# Patient Record
Sex: FEMALE | Race: WHITE | NOT HISPANIC OR LATINO | Employment: UNEMPLOYED | URBAN - NONMETROPOLITAN AREA
[De-identification: names, ages, dates, MRNs, and addresses within clinical notes are randomized per-mention and may not be internally consistent; named-entity substitution may affect disease eponyms.]

---

## 2023-05-02 ENCOUNTER — NURSE TRIAGE (OUTPATIENT)
Dept: CALL CENTER | Facility: HOSPITAL | Age: 33
End: 2023-05-02
Payer: OTHER GOVERNMENT

## 2023-05-02 NOTE — TELEPHONE ENCOUNTER
Tina states she has had a left eye twitch for 6-8 weeks, now it is causing numbness in from her eyebrow to her cheekbone, sometimes into her lip. Reviewed guideline with tina, advises she see her PCP within 4 hours. Tina states she does not have a PCP. Advised she go to ED or UC for evaluation of this symptoms. States she will talk to her  and make a decision about her care.     Reason for Disposition  • [1] Numbness (i.e., loss of sensation) of the face, arm / hand, or leg / foot on one side of the body AND [2] gradual onset (e.g., days to weeks) AND [3] present now    Additional Information  • Negative: [1] SEVERE weakness (i.e., unable to walk or barely able to walk, requires support) AND [2] new-onset or worsening  • Negative: [1] Weakness (i.e., paralysis, loss of muscle strength) of the face, arm / hand, or leg / foot on one side of the body AND [2] sudden onset AND [3] present now  (Exception: Bell's palsy suspected [i.e., weakness only on one side of the face, developing over hours to days, no other symptoms].)  • Negative: [1] Numbness (i.e., loss of sensation) of the face, arm / hand, or leg / foot on one side of the body AND [2] sudden onset AND [3] present now  • Negative: [1] Loss of speech or garbled speech AND [2] sudden onset AND [3] present now  • Negative: Difficult to awaken or acting confused (e.g., disoriented, slurred speech)  • Negative: Sounds like a life-threatening emergency to the triager  • Negative: Confusion, disorientation, or hallucinations is main symptom  • Negative: Neck pain is main symptom (and having weakness, numbness, or tingling in arm / hand because of neck pain)  • Negative: Back pain is main symptom (and having weakness, numbness, or tingling in leg because of back pain)  • Negative: Hand pain is main symptom (and having mild weakness, numbness, or tingling in hand related to hand pain)  • Negative: Dizziness is main symptom  • Negative: Vision loss or change  "is main symptom  • Negative: Followed a head injury within last 3 days  • Negative: Followed a neck injury within last 3 days  • Negative: [1] Tingling in both hands and/or feet AND [2] breathing faster than normal AND [3] feels similar to prior panic attack or hyperventilation episode  • Negative: Weakness in both sides of the body or weakness all over  • Negative: Headache  (and neurologic deficit)  • Negative: [1] Back pain AND [2] numbness (loss of sensation) in groin or rectal area  • Negative: [1] Unable to urinate (or only a few drops) > 4 hours AND [2] bladder feels very full (e.g., palpable bladder or strong urge to urinate)  • Negative: [1] Loss of bladder or bowel control (urine or bowel incontinence; wetting self, leaking stool) AND [2] new-onset  • Negative: [1] Weakness (i.e., paralysis, loss of muscle strength) of the face, arm / hand, or leg / foot on one side of the body AND [2] sudden onset AND [3] brief (now gone)  • Negative: [1] Numbness (i.e., loss of sensation) of the face, arm / hand, or leg / foot on one side of the body AND [2] sudden onset AND [3] brief (now gone)  • Negative: [1] Loss of speech or garbled speech AND [2] sudden onset AND [3] brief (now gone)  • Negative: Bell's palsy suspected (i.e., weakness on only one side of the face, developing over hours to days, no other symptoms)  • Negative: Patient sounds very sick or weak to the triager  • Negative: Neck pain (and neurologic deficit)  • Negative: Back pain (and neurologic deficit)  • Negative: [1] Weakness of the face, arm / hand, or leg / foot on one side of the body AND [2] gradual onset (e.g., days to weeks) AND [3] present now    Answer Assessment - Initial Assessment Questions  1. SYMPTOM: \"What is the main symptom you are concerned about?\" (e.g., weakness, numbness)      Numbness from eyebrow to cheekbone, sometimes in her lip she associates this with a twitch in her left eye   2. ONSET: \"When did this start?\" (minutes, " "hours, days; while sleeping)      2 weeks ago   3. LAST NORMAL: \"When was the last time you (the patient) were normal (no symptoms)?\"      6-8 weeks ago   4. PATTERN \"Does this come and go, or has it been constant since it started?\"  \"Is it present now?\"      Comes and goes, numbness is there now  5. CARDIAC SYMPTOMS: \"Have you had any of the following symptoms: chest pain, difficulty breathing, palpitations?\"      no  6. NEUROLOGIC SYMPTOMS: \"Have you had any of the following symptoms: headache, dizziness, vision loss, double vision, changes in speech, unsteady on your feet?\"      No, hx of migraines   7. OTHER SYMPTOMS: \"Do you have any other symptoms?\"      Left eye twitch  8. PREGNANCY: \"Is there any chance you are pregnant?\" \"When was your last menstrual period?\"      no    Protocols used: NEUROLOGIC DEFICIT-ADULT-AH      "

## 2023-06-02 ENCOUNTER — OFFICE VISIT (OUTPATIENT)
Dept: FAMILY MEDICINE CLINIC | Facility: CLINIC | Age: 33
End: 2023-06-02

## 2023-06-02 VITALS
HEIGHT: 63 IN | TEMPERATURE: 98 F | WEIGHT: 232.8 LBS | DIASTOLIC BLOOD PRESSURE: 68 MMHG | OXYGEN SATURATION: 98 % | SYSTOLIC BLOOD PRESSURE: 98 MMHG | BODY MASS INDEX: 41.25 KG/M2 | HEART RATE: 78 BPM

## 2023-06-02 DIAGNOSIS — G89.29 CHRONIC BILATERAL LOW BACK PAIN WITHOUT SCIATICA: ICD-10-CM

## 2023-06-02 DIAGNOSIS — Z11.59 ENCOUNTER FOR HEPATITIS C SCREENING TEST FOR LOW RISK PATIENT: ICD-10-CM

## 2023-06-02 DIAGNOSIS — J45.20 MILD INTERMITTENT ASTHMA WITHOUT COMPLICATION: ICD-10-CM

## 2023-06-02 DIAGNOSIS — R25.3 EYE MUSCLE TWITCHES: ICD-10-CM

## 2023-06-02 DIAGNOSIS — Z13.220 LIPID SCREENING: ICD-10-CM

## 2023-06-02 DIAGNOSIS — R20.0 LEFT FACIAL NUMBNESS: ICD-10-CM

## 2023-06-02 DIAGNOSIS — Z12.4 CERVICAL CANCER SCREENING: ICD-10-CM

## 2023-06-02 DIAGNOSIS — Z76.89 ENCOUNTER TO ESTABLISH CARE: Primary | ICD-10-CM

## 2023-06-02 DIAGNOSIS — E66.01 CLASS 3 SEVERE OBESITY DUE TO EXCESS CALORIES WITHOUT SERIOUS COMORBIDITY WITH BODY MASS INDEX (BMI) OF 40.0 TO 44.9 IN ADULT: ICD-10-CM

## 2023-06-02 DIAGNOSIS — M54.50 CHRONIC BILATERAL LOW BACK PAIN WITHOUT SCIATICA: ICD-10-CM

## 2023-06-02 DIAGNOSIS — N92.6 IRREGULAR MENSES: ICD-10-CM

## 2023-06-02 LAB
25(OH)D3 SERPL-MCNC: 11.4 NG/ML (ref 30–100)
ALBUMIN SERPL-MCNC: 4 G/DL (ref 3.5–5.2)
ALBUMIN/GLOB SERPL: 1.1 G/DL
ALP SERPL-CCNC: 60 U/L (ref 39–117)
ALT SERPL W P-5'-P-CCNC: 30 U/L (ref 1–33)
ANION GAP SERPL CALCULATED.3IONS-SCNC: 11.6 MMOL/L (ref 5–15)
AST SERPL-CCNC: 20 U/L (ref 1–32)
BASOPHILS # BLD MANUAL: 0.18 10*3/MM3 (ref 0–0.2)
BASOPHILS NFR BLD MANUAL: 2 % (ref 0–1.5)
BILIRUB SERPL-MCNC: 0.3 MG/DL (ref 0–1.2)
BUN SERPL-MCNC: 13 MG/DL (ref 6–20)
BUN/CREAT SERPL: 20 (ref 7–25)
CALCIUM SPEC-SCNC: 9.8 MG/DL (ref 8.6–10.5)
CHLORIDE SERPL-SCNC: 105 MMOL/L (ref 98–107)
CHOLEST SERPL-MCNC: 195 MG/DL (ref 0–200)
CO2 SERPL-SCNC: 25.4 MMOL/L (ref 22–29)
CREAT SERPL-MCNC: 0.65 MG/DL (ref 0.57–1)
DEPRECATED RDW RBC AUTO: 39.5 FL (ref 37–54)
EGFRCR SERPLBLD CKD-EPI 2021: 119.4 ML/MIN/1.73
EOSINOPHIL # BLD MANUAL: 0.35 10*3/MM3 (ref 0–0.4)
EOSINOPHIL NFR BLD MANUAL: 4 % (ref 0.3–6.2)
ERYTHROCYTE [DISTWIDTH] IN BLOOD BY AUTOMATED COUNT: 12.9 % (ref 12.3–15.4)
GLOBULIN UR ELPH-MCNC: 3.5 GM/DL
GLUCOSE SERPL-MCNC: 89 MG/DL (ref 65–99)
HBA1C MFR BLD: 5.5 % (ref 4.8–5.6)
HCT VFR BLD AUTO: 42.2 % (ref 34–46.6)
HCV AB SER DONR QL: NORMAL
HDLC SERPL-MCNC: 35 MG/DL (ref 40–60)
HGB BLD-MCNC: 14 G/DL (ref 12–15.9)
LDLC SERPL CALC-MCNC: 133 MG/DL (ref 0–100)
LDLC/HDLC SERPL: 3.71 {RATIO}
LYMPHOCYTES # BLD MANUAL: 2.48 10*3/MM3 (ref 0.7–3.1)
LYMPHOCYTES NFR BLD MANUAL: 10 % (ref 5–12)
MCH RBC QN AUTO: 28.5 PG (ref 26.6–33)
MCHC RBC AUTO-ENTMCNC: 33.2 G/DL (ref 31.5–35.7)
MCV RBC AUTO: 85.9 FL (ref 79–97)
MONOCYTES # BLD: 0.88 10*3/MM3 (ref 0.1–0.9)
NEUTROPHILS # BLD AUTO: 4.95 10*3/MM3 (ref 1.7–7)
NEUTROPHILS NFR BLD MANUAL: 56 % (ref 42.7–76)
PLAT MORPH BLD: NORMAL
PLATELET # BLD AUTO: 327 10*3/MM3 (ref 140–450)
PMV BLD AUTO: 10.7 FL (ref 6–12)
POTASSIUM SERPL-SCNC: 3.9 MMOL/L (ref 3.5–5.2)
PROT SERPL-MCNC: 7.5 G/DL (ref 6–8.5)
RBC # BLD AUTO: 4.91 10*6/MM3 (ref 3.77–5.28)
RBC MORPH BLD: NORMAL
SODIUM SERPL-SCNC: 142 MMOL/L (ref 136–145)
TRIGL SERPL-MCNC: 150 MG/DL (ref 0–150)
TSH SERPL DL<=0.05 MIU/L-ACNC: 1.21 UIU/ML (ref 0.27–4.2)
VARIANT LYMPHS NFR BLD MANUAL: 2 % (ref 0–5)
VARIANT LYMPHS NFR BLD MANUAL: 26 % (ref 19.6–45.3)
VIT B12 BLD-MCNC: 521 PG/ML (ref 211–946)
VLDLC SERPL-MCNC: 27 MG/DL (ref 5–40)
WBC MORPH BLD: NORMAL
WBC NRBC COR # BLD: 8.84 10*3/MM3 (ref 3.4–10.8)

## 2023-06-02 PROCEDURE — 80061 LIPID PANEL: CPT | Performed by: NURSE PRACTITIONER

## 2023-06-02 PROCEDURE — 85025 COMPLETE CBC W/AUTO DIFF WBC: CPT | Performed by: NURSE PRACTITIONER

## 2023-06-02 PROCEDURE — 82607 VITAMIN B-12: CPT | Performed by: NURSE PRACTITIONER

## 2023-06-02 PROCEDURE — 80053 COMPREHEN METABOLIC PANEL: CPT | Performed by: NURSE PRACTITIONER

## 2023-06-02 PROCEDURE — 84443 ASSAY THYROID STIM HORMONE: CPT | Performed by: NURSE PRACTITIONER

## 2023-06-02 PROCEDURE — 85007 BL SMEAR W/DIFF WBC COUNT: CPT | Performed by: NURSE PRACTITIONER

## 2023-06-02 PROCEDURE — 82306 VITAMIN D 25 HYDROXY: CPT | Performed by: NURSE PRACTITIONER

## 2023-06-02 PROCEDURE — 86803 HEPATITIS C AB TEST: CPT | Performed by: NURSE PRACTITIONER

## 2023-06-02 PROCEDURE — 83036 HEMOGLOBIN GLYCOSYLATED A1C: CPT | Performed by: NURSE PRACTITIONER

## 2023-06-02 NOTE — PROGRESS NOTES
Chief Complaint  Establish Care, Eye Problem (Left eye twitching and numbness down left side of face and mouth x 3 months), Menstrual Problem (Irregular periods with heavy flow), Back Pain, and Neck Pain    Subjective         Renate Corona presents to [facility]  HPI   Presents today to establish care.  No previous PCP.  She has 2 children.  Past medical history of asthma.  Uses albuterol inhaler as needed.  She usually has to take the inhaler prior to exercise.  Last Pap was in 2016.  She reports she has been having irregular periods.  She has heavy bleeding and cramping.  Over the past 3 months she has been experiencing left thigh muscle twitching.  It was persistent and now has slightly improved where it occurs 3-4 times a day.  She is seen an eye doctor.  She is now having some left side facial numbness.  The numbness area has slightly decreased in the past couple weeks.  She does report having chronic back and neck pain.  Has seen a chiropractor at her previous location of living.  She reports chiropractor has helped.  She currently taking over-the-counter Aleve as needed.    Social History     Socioeconomic History   • Marital status:    Tobacco Use   • Smoking status: Former     Packs/day: 0.50     Years: 10.00     Pack years: 5.00     Types: Cigarettes     Start date: 5/1/2002     Quit date: 8/2/2012     Years since quitting: 10.8   • Smokeless tobacco: Never   • Tobacco comments:     Occasionally use smokeless tobacco   Vaping Use   • Vaping Use: Never used   Substance and Sexual Activity   • Alcohol use: Yes     Alcohol/week: 1.0 - 2.0 standard drink     Types: 1 - 2 Cans of beer per week     Comment: Occasionally   • Drug use: Not Currently     Frequency: 7.0 times per week     Types: Benzodiazepines, Hydrocodone, Marijuana, Oxycodone     Comment: Last used 2009   • Sexual activity: Yes     Partners: Male     Birth control/protection: Tubal ligation     "    Objective     Vitals:    06/02/23 0912   BP: 98/68   BP Location: Left arm   Patient Position: Sitting   Cuff Size: Adult   Pulse: 78   Temp: 98 °F (36.7 °C)   TempSrc: Oral   SpO2: 98%   Weight: 106 kg (232 lb 12.8 oz)   Height: 160 cm (63\")        Body mass index is 41.24 kg/m².    Wt Readings from Last 3 Encounters:   06/02/23 106 kg (232 lb 12.8 oz)   10/19/21 97.5 kg (215 lb)       BP Readings from Last 3 Encounters:   06/02/23 98/68   10/19/21 126/79         Physical Exam  Vitals reviewed.   Constitutional:       Appearance: Normal appearance. She is well-developed. She is morbidly obese.   HENT:      Head: Normocephalic and atraumatic.      Right Ear: External ear normal.      Left Ear: External ear normal.      Mouth/Throat:      Pharynx: No oropharyngeal exudate.   Eyes:      Conjunctiva/sclera: Conjunctivae normal.      Pupils: Pupils are equal, round, and reactive to light.   Cardiovascular:      Rate and Rhythm: Normal rate and regular rhythm.      Heart sounds: No murmur heard.    No friction rub. No gallop.   Pulmonary:      Effort: Pulmonary effort is normal.      Breath sounds: Normal breath sounds. No wheezing or rhonchi.   Skin:     General: Skin is warm and dry.   Neurological:      Mental Status: She is alert and oriented to person, place, and time.   Psychiatric:         Mood and Affect: Mood and affect normal.         Behavior: Behavior normal.         Thought Content: Thought content normal.         Judgment: Judgment normal.          Result Review :   The following data was reviewed by: ALEKSANDR Multani on 06/02/2023:      Procedures    Assessment and Plan   Diagnoses and all orders for this visit:    1. Encounter to establish care (Primary)    2. Class 3 severe obesity due to excess calories without serious comorbidity with body mass index (BMI) of 40.0 to 44.9 in adult  -     CBC & Differential  -     Comprehensive Metabolic Panel  -     Hemoglobin A1c  -     TSH Rfx On Abnormal To " Free T4  -     Vitamin D,25-Hydroxy    3. Eye muscle twitches    4. Left facial numbness  -     Vitamin B12  -     CT Head Without Contrast; Future    5. Irregular menses  -     US Non-ob Transvaginal; Future  -     Ambulatory Referral to Obstetrics / Gynecology    6. Cervical cancer screening  -     Ambulatory Referral to Obstetrics / Gynecology    7. Lipid screening  -     Lipid Panel    8. Encounter for hepatitis C screening test for low risk patient  -     Hepatitis C Antibody    9. Mild intermittent asthma without complication    10. Chronic bilateral low back pain without sciatica    We will check the following labs today including CBC CMP A1c TSH vitamin D B12 lipid and screen for hepatitis C antibody.  We will order a CT scan for her left-sided facial numbness that is around her left eye and cheek.  She has already seen an eye doctor for the eye twitching.  For the irregular menses will order transvaginal ultrasound and consult OB/GYN also for cervical cancer screening.  May continue taking albuterol inhaler as needed for asthma.      Class 3 Severe Obesity (BMI >=40). Obesity-related health conditions include the following: none. Obesity is newly identified. BMI is is above average; BMI management plan is completed. We discussed portion control and increasing exercise.        Follow Up   Return in about 3 months (around 9/2/2023), or if symptoms worsen or fail to improve, for Next scheduled follow up.  Patient was given instructions and counseling regarding her condition or for health maintenance advice. Please see specific information pulled into the AVS if appropriate.     Please note that portions of this note were completed with a voice recognition program.

## 2023-06-05 RX ORDER — ERGOCALCIFEROL 1.25 MG/1
50000 CAPSULE ORAL WEEKLY
Qty: 13 CAPSULE | Refills: 1 | Status: SHIPPED | OUTPATIENT
Start: 2023-06-05

## 2023-08-21 ENCOUNTER — OFFICE VISIT (OUTPATIENT)
Dept: OBSTETRICS AND GYNECOLOGY | Facility: CLINIC | Age: 33
End: 2023-08-21
Payer: OTHER GOVERNMENT

## 2023-08-21 VITALS
DIASTOLIC BLOOD PRESSURE: 85 MMHG | SYSTOLIC BLOOD PRESSURE: 122 MMHG | HEART RATE: 111 BPM | HEIGHT: 62 IN | BODY MASS INDEX: 43.98 KG/M2 | WEIGHT: 239 LBS

## 2023-08-21 DIAGNOSIS — N93.9 ABNORMAL UTERINE BLEEDING (AUB): ICD-10-CM

## 2023-08-21 DIAGNOSIS — Z01.419 WOMEN'S ANNUAL ROUTINE GYNECOLOGICAL EXAMINATION: Primary | ICD-10-CM

## 2023-08-21 LAB
DEPRECATED RDW RBC AUTO: 39.5 FL (ref 37–54)
ERYTHROCYTE [DISTWIDTH] IN BLOOD BY AUTOMATED COUNT: 13 % (ref 12.3–15.4)
HCT VFR BLD AUTO: 41.5 % (ref 34–46.6)
HGB BLD-MCNC: 14 G/DL (ref 12–15.9)
MCH RBC QN AUTO: 28.9 PG (ref 26.6–33)
MCHC RBC AUTO-ENTMCNC: 33.7 G/DL (ref 31.5–35.7)
MCV RBC AUTO: 85.7 FL (ref 79–97)
PLATELET # BLD AUTO: 309 10*3/MM3 (ref 140–450)
PMV BLD AUTO: 10.9 FL (ref 6–12)
PROLACTIN SERPL-MCNC: 4.49 NG/ML (ref 4.79–23.3)
RBC # BLD AUTO: 4.84 10*6/MM3 (ref 3.77–5.28)
T4 FREE SERPL-MCNC: 1.01 NG/DL (ref 0.93–1.7)
TSH SERPL DL<=0.05 MIU/L-ACNC: 1.18 UIU/ML (ref 0.27–4.2)
WBC NRBC COR # BLD: 8.94 10*3/MM3 (ref 3.4–10.8)

## 2023-08-21 PROCEDURE — 85027 COMPLETE CBC AUTOMATED: CPT | Performed by: OBSTETRICS & GYNECOLOGY

## 2023-08-21 PROCEDURE — 99214 OFFICE O/P EST MOD 30 MIN: CPT | Performed by: OBSTETRICS & GYNECOLOGY

## 2023-08-21 PROCEDURE — 99385 PREV VISIT NEW AGE 18-39: CPT | Performed by: OBSTETRICS & GYNECOLOGY

## 2023-08-21 PROCEDURE — 84443 ASSAY THYROID STIM HORMONE: CPT | Performed by: OBSTETRICS & GYNECOLOGY

## 2023-08-21 PROCEDURE — 84146 ASSAY OF PROLACTIN: CPT | Performed by: OBSTETRICS & GYNECOLOGY

## 2023-08-21 PROCEDURE — 36415 COLL VENOUS BLD VENIPUNCTURE: CPT | Performed by: OBSTETRICS & GYNECOLOGY

## 2023-08-21 PROCEDURE — 84439 ASSAY OF FREE THYROXINE: CPT | Performed by: OBSTETRICS & GYNECOLOGY

## 2023-08-21 RX ORDER — MEDROXYPROGESTERONE ACETATE 10 MG/1
10 TABLET ORAL DAILY
Qty: 21 TABLET | Refills: 3 | Status: SHIPPED | OUTPATIENT
Start: 2023-08-21

## 2023-08-21 RX ORDER — NAPROXEN SODIUM 220 MG
220 TABLET ORAL DAILY
COMMUNITY

## 2023-08-21 NOTE — ASSESSMENT & PLAN NOTE
Check labs  Pelvic ultrasound reviewed  Start Provera 10 mg daily for 21 days each month.  We will do this for about 3 months and then reevaluate the patient's menstrual cycle.  At that time if cycles are regular could consider stopping the Provera to see if the cycles remain regular.

## 2023-08-21 NOTE — PROGRESS NOTES
"Well Woman Visit    CC: JUAN    HPI:   33 y.o. who presents for a well woman exam.  The patient does report for over the last several years she has noticed her cycles have become very irregular.  She can have a cycle 2-3 times per month or she may go 2 months without a menstrual cycle.  Prior to having children her cycles were very regular, occurring once a month lasting about 5 to 6 days.  Currently they can last anywhere from 3days to 14 days.  At times she can have very heavy flow.  No other problems or concerns.    History: PMHx, Meds, Allergies, PSHx, Social Hx, and POBHx all reviewed and updated.    /85   Pulse 111   Ht 157.5 cm (62\")   Wt 108 kg (239 lb)   LMP 2023 (Exact Date)   Breastfeeding No   BMI 43.71 kg/mý     Physical Exam  Vitals and nursing note reviewed. Exam conducted with a chaperone present.   Constitutional:       General: She is not in acute distress.     Appearance: Normal appearance. She is not ill-appearing.   HENT:      Head: Normocephalic and atraumatic.   Eyes:      Extraocular Movements: Extraocular movements intact.   Neck:      Thyroid: No thyroid mass or thyromegaly.   Chest:   Breasts:     Breasts are symmetrical.      Right: Normal. No swelling, bleeding, inverted nipple, mass, nipple discharge, skin change or tenderness.      Left: Normal. No swelling, bleeding, inverted nipple, mass, nipple discharge, skin change or tenderness.   Abdominal:      General: Abdomen is flat. There is no distension.      Palpations: Abdomen is soft. There is no mass.      Tenderness: There is no abdominal tenderness. There is no guarding or rebound.      Hernia: No hernia is present. There is no hernia in the left inguinal area or right inguinal area.   Genitourinary:     General: Normal vulva.      Exam position: Lithotomy position.      Pubic Area: No rash.       Labia:         Right: No rash, tenderness, lesion or injury.         Left: No rash, tenderness, lesion or injury. "       Urethra: No prolapse, urethral pain or urethral lesion.      Vagina: No signs of injury and foreign body. Bleeding present. No vaginal discharge, erythema, tenderness, lesions or prolapsed vaginal walls.      Cervix: Cervical bleeding present. No cervical motion tenderness, discharge, friability, lesion or erythema.      Uterus: Normal. Not deviated, not enlarged, not fixed and not tender.       Adnexa: Right adnexa normal and left adnexa normal.        Right: No mass, tenderness or fullness.          Left: No mass, tenderness or fullness.        Comments: The patient is having a light menstrual flow  The patient's exam is limited by habitus  Musculoskeletal:         General: No swelling or tenderness.      Right lower leg: No edema.      Left lower leg: No edema.   Lymphadenopathy:      Upper Body:      Right upper body: No supraclavicular or axillary adenopathy.      Left upper body: No supraclavicular or axillary adenopathy.   Skin:     General: Skin is warm and dry.      Findings: No rash.   Neurological:      Mental Status: She is alert and oriented to person, place, and time.   Psychiatric:         Mood and Affect: Mood normal.         Behavior: Behavior normal.         Thought Content: Thought content normal.       Study Result    PROCEDURE:  US NON-OB TRANSVAGINAL     COMPARISON: None     INDICATIONS:  irregular menses     TECHNIQUE:    Ultrasound examination of the pelvis was performed, using endovaginal technique.       FINDINGS:          Uterus measures 7.2 x 4.6 x 5.6 centimeters.  Right ovary measures 3.3 x 3.6 x 2.6 centimeters.    Right ovarian volume is 11.9 cubic centimeters.  Left ovary measures 3.2 x 2.1 x 2.4 centimeters.    Left ovarian volume is 8.2 cubic centimeters.     Nabothian cysts are seen.  There is a cluster of cystic lesions measuring up to about 2.3   centimeters in the cervix.  No internal vascularity is present.  The cervix is closed.  Subtle   hypoechoic lesion in mid uterus  measures 2.0 centimeters likely a fibroid.  Endometrium measures   about 0.9 centimeters.     Simple cyst in the left ovary measures up to about 2.7 centimeters.  Right ovary is only seen   transabdominally and demonstrates no acute findings.     IMPRESSION:                 1. Normal endometrial thickness for age.  2. Subtle hypoechoic 2 centimeter lesion in the mid uterus likely a fibroid  3. Multiple cystic lesions in the region the cervix likely nabothian cysts.  4. Small cyst in the left ovary measuring 2.7 centimeters.                ZUNILDA DE MD         Electronically Signed and Approved By: ZUNILDA DE MD on 6/30/2023 at 10:53    ASSESSMENT AND PLAN:   Diagnoses and all orders for this visit:    1. Women's annual routine gynecological examination (Primary)  Assessment & Plan:  Pap  Recommend daily multivitamin with folic acid    Orders:  -     IGP,rfx Aptima HPV All Pth    2. Abnormal uterine bleeding (AUB)  Assessment & Plan:  Check labs  Pelvic ultrasound reviewed  Start Provera 10 mg daily for 21 days each month.  We will do this for about 3 months and then reevaluate the patient's menstrual cycle.  At that time if cycles are regular could consider stopping the Provera to see if the cycles remain regular.    Orders:  -     CBC (No Diff)  -     TSH  -     T4, free  -     Prolactin  -     medroxyPROGESTERone (Provera) 10 MG tablet; Take 1 tablet by mouth Daily.  Dispense: 21 tablet; Refill: 3        Counseling:     All BC Options R/B/A/SE/E of each reviewed  Track menses, RTO IF <q21d, >7d long, or heavy    Preventative:   Recommend FLU vaccine this season, R/B discussed  Recommend COVID vaccine, R/B discussed      She understands the importance of having any ordered tests to be performed in a timely fashion.  The risks of not performing them include, but are not limited to, advanced cancer stages, bone loss from osteoporosis and/or subsequent increase in morbidity and/or mortality.  She is  encouraged to review her results online and/or contact or office if she has questions.     Follow Up:  Return in about 3 months (around 11/21/2023) for Recheck.      Reginaldo Carrillo MD  08/21/2023

## 2023-08-21 NOTE — PATIENT INSTRUCTIONS
Venipuncture Blood Specimen Collection  Venipuncture performed in left arm by Cassidy Huston with good hemostasis. Patient tolerated the procedure well without complications.   08/21/23   Cassidy Huston

## 2023-08-24 LAB
CONV .: NORMAL
CYTOLOGIST CVX/VAG CYTO: NORMAL
CYTOLOGY CVX/VAG DOC CYTO: NORMAL
CYTOLOGY CVX/VAG DOC THIN PREP: NORMAL
DX ICD CODE: NORMAL
HIV 1 & 2 AB SER-IMP: NORMAL
OTHER STN SPEC: NORMAL
STAT OF ADQ CVX/VAG CYTO-IMP: NORMAL

## 2023-08-25 ENCOUNTER — OFFICE VISIT (OUTPATIENT)
Dept: FAMILY MEDICINE CLINIC | Facility: CLINIC | Age: 33
End: 2023-08-25
Payer: OTHER GOVERNMENT

## 2023-08-25 VITALS
TEMPERATURE: 98.1 F | HEIGHT: 62 IN | HEART RATE: 103 BPM | WEIGHT: 239 LBS | SYSTOLIC BLOOD PRESSURE: 122 MMHG | BODY MASS INDEX: 43.98 KG/M2 | DIASTOLIC BLOOD PRESSURE: 78 MMHG | OXYGEN SATURATION: 97 %

## 2023-08-25 DIAGNOSIS — Z00.00 ANNUAL PHYSICAL EXAM: Primary | ICD-10-CM

## 2023-08-25 DIAGNOSIS — E55.9 VITAMIN D DEFICIENCY: ICD-10-CM

## 2023-08-25 DIAGNOSIS — Z23 NEED FOR PNEUMOCOCCAL VACCINE: ICD-10-CM

## 2023-08-25 LAB — 25(OH)D3 SERPL-MCNC: 19.8 NG/ML (ref 30–100)

## 2023-08-25 PROCEDURE — 99395 PREV VISIT EST AGE 18-39: CPT | Performed by: NURSE PRACTITIONER

## 2023-08-25 PROCEDURE — 36415 COLL VENOUS BLD VENIPUNCTURE: CPT | Performed by: NURSE PRACTITIONER

## 2023-08-25 PROCEDURE — 82306 VITAMIN D 25 HYDROXY: CPT | Performed by: NURSE PRACTITIONER

## 2023-08-25 RX ORDER — ERGOCALCIFEROL 1.25 MG/1
50000 CAPSULE ORAL WEEKLY
Qty: 13 CAPSULE | Refills: 3 | Status: SHIPPED | OUTPATIENT
Start: 2023-08-25

## 2023-08-25 NOTE — PROGRESS NOTES
"Answers submitted by the patient for this visit:  Other (Submitted on 2023)  Please describe your symptoms.: Recheck of vitamin d level/ follow up visit  Have you had these symptoms before?: Yes  How long have you been having these symptoms?: Greater than 2 weeks  Please list any medications you are currently taking for this condition.: Vitamin d 15063 units  Primary Reason for Visit (Submitted on 2023)  What is the primary reason for your visit?: Other  Chief Complaint  Obesity and Vitamin D Deficiency    Subjective         Renate Corona presents to [facility]  HPI   PResents today for follow-up on vitamin D deficiency and for an annual physical exam.  Has been taking vitamin D supplement 1 capsule weekly.  Tolerating medication well.  Recently had her Pap smear with OB/GYN.  She is a former smoker.    Social History     Socioeconomic History    Marital status:    Tobacco Use    Smoking status: Former     Packs/day: 0.50     Years: 10.00     Pack years: 5.00     Types: Cigarettes     Start date: 2002     Quit date: 2012     Years since quittin.0    Smokeless tobacco: Never    Tobacco comments:     Occasionally use smokeless tobacco   Vaping Use    Vaping Use: Never used   Substance and Sexual Activity    Alcohol use: Yes     Alcohol/week: 1.0 - 2.0 standard drink     Types: 1 - 2 Cans of beer per week     Comment: Occasionally    Drug use: Not Currently     Frequency: 7.0 times per week     Types: Benzodiazepines, Hydrocodone, Marijuana, Oxycodone     Comment: Last used     Sexual activity: Yes     Partners: Male     Birth control/protection: Tubal ligation        Objective     Vitals:    23 0808   BP: 122/78   BP Location: Left arm   Patient Position: Sitting   Cuff Size: Adult   Pulse: 103   Temp: 98.1 øF (36.7 øC)   TempSrc: Oral   SpO2: 97%   Weight: 108 kg (239 lb)   Height: 157.5 cm (62\")        Body mass index is 43.71 kg/mý.    Wt " Readings from Last 3 Encounters:   08/25/23 108 kg (239 lb)   08/21/23 108 kg (239 lb)   06/02/23 106 kg (232 lb 12.8 oz)       BP Readings from Last 3 Encounters:   08/25/23 122/78   08/21/23 122/85   06/02/23 98/68         Physical Exam  Vitals reviewed.   Constitutional:       Appearance: Normal appearance. She is well-developed. She is morbidly obese.   HENT:      Head: Normocephalic and atraumatic.      Right Ear: External ear normal.      Left Ear: External ear normal.      Mouth/Throat:      Pharynx: No oropharyngeal exudate.   Eyes:      Conjunctiva/sclera: Conjunctivae normal.      Pupils: Pupils are equal, round, and reactive to light.   Cardiovascular:      Rate and Rhythm: Normal rate and regular rhythm.      Heart sounds: No murmur heard.    No friction rub. No gallop.   Pulmonary:      Effort: Pulmonary effort is normal.      Breath sounds: Normal breath sounds. No wheezing or rhonchi.   Skin:     General: Skin is warm and dry.   Neurological:      Mental Status: She is alert and oriented to person, place, and time.   Psychiatric:         Mood and Affect: Mood and affect normal.         Behavior: Behavior normal.         Thought Content: Thought content normal.         Judgment: Judgment normal.        Result Review :   The following data was reviewed by: ALEKSANDR Multani on 08/25/2023:  Common labs          6/2/2023    10:23 8/21/2023    11:25   Common Labs   Glucose 89     BUN 13     Creatinine 0.65     Sodium 142     Potassium 3.9     Chloride 105     Calcium 9.8     Albumin 4.0     Total Bilirubin 0.3     Alkaline Phosphatase 60     AST (SGOT) 20     ALT (SGPT) 30     WBC 8.84  8.94    Hemoglobin 14.0  14.0    Hematocrit 42.2  41.5    Platelets 327  309    Total Cholesterol 195     Triglycerides 150     HDL Cholesterol 35     LDL Cholesterol  133     Hemoglobin A1C 5.50       Lab Results   Component Value Date    TUSN68NA 11.4 (L) 06/02/2023       Procedures    Assessment and Plan   Diagnoses  and all orders for this visit:    1. Annual physical exam (Primary)    2. Vitamin D deficiency  -     Vitamin D,25-Hydroxy    3. Need for pneumococcal vaccine    Other orders  -     vitamin D (ERGOCALCIFEROL) 1.25 MG (39160 UT) capsule capsule; Take 1 capsule by mouth 1 (One) Time Per Week.  Dispense: 13 capsule; Refill: 3      Clines pneumococcal vaccine.  Tdap is up-to-date.  Next dose due in 2026.  Check vitamin D levels today.    The patient is advised to begin progressive daily aerobic exercise program, follow a low fat, low cholesterol diet, and attempt to lose weight.               Follow Up   Return in about 6 months (around 2/25/2024), or if symptoms worsen or fail to improve, for Next scheduled follow up.  Patient was given instructions and counseling regarding her condition or for health maintenance advice. Please see specific information pulled into the AVS if appropriate.     Please note that portions of this note were completed with a voice recognition program.

## 2023-11-21 ENCOUNTER — OFFICE VISIT (OUTPATIENT)
Dept: OBSTETRICS AND GYNECOLOGY | Facility: CLINIC | Age: 33
End: 2023-11-21
Payer: OTHER GOVERNMENT

## 2023-11-21 VITALS
HEART RATE: 106 BPM | DIASTOLIC BLOOD PRESSURE: 80 MMHG | HEIGHT: 62 IN | WEIGHT: 244 LBS | BODY MASS INDEX: 44.9 KG/M2 | SYSTOLIC BLOOD PRESSURE: 118 MMHG

## 2023-11-21 DIAGNOSIS — N93.9 ABNORMAL UTERINE BLEEDING (AUB): Primary | ICD-10-CM

## 2023-11-21 DIAGNOSIS — N93.9 ABNORMAL UTERINE BLEEDING (AUB): ICD-10-CM

## 2023-11-21 PROBLEM — Z01.419 WOMEN'S ANNUAL ROUTINE GYNECOLOGICAL EXAMINATION: Status: RESOLVED | Noted: 2023-08-21 | Resolved: 2023-11-21

## 2023-11-21 PROCEDURE — 99213 OFFICE O/P EST LOW 20 MIN: CPT | Performed by: OBSTETRICS & GYNECOLOGY

## 2023-11-21 NOTE — TELEPHONE ENCOUNTER
Denied 90 day supply for Aygestin.  Last seen 11/21/23.  Last filled 11/21/23 Aygestin #30 with 3 refills.  Next appointment 12/13/23

## 2023-11-21 NOTE — ASSESSMENT & PLAN NOTE
We have discussed options for therapy including continued medical therapy, changing to a different type of medical therapy, IUD use, and surgical therapy including dilation and curettage, endometrial ablation and hysterectomy.  After reviewing all these options the patient is strongly considering endometrial ablation.  We will switch the patient to Aygestin 10 mg oral daily to see if we can completely suppress her menstrual cycle or at least improve the number of days of spotting.  I have provided handouts on endometrial ablation.  Given the patient's continued abnormal bleeding I recommended endometrial biopsy regardless of proceeding with ablation or not.  We will plan to follow-up for the endometrial biopsy and further discuss surgery from there.

## 2023-11-21 NOTE — PROGRESS NOTES
"GYN Visit    CC: Follow-up meds    HPI:   33 y.o. who presents in follow-up of medication to treat abnormal uterine bleeding.  The patient has been taking Provera for the last few months.  She has noticed a marked decrease in the amount of flow but still having between 10 and 18 days of bleeding.  The bleeding is just now very light.  She is considering possible surgical therapy including endometrial ablation.    History: PMHx, Meds, Allergies, PSHx, Social Hx, and POBHx all reviewed and updated.    /80   Pulse 106   Ht 157.5 cm (62\")   Wt 111 kg (244 lb)   LMP 2023   Breastfeeding No   BMI 44.63 kg/m²     Physical Exam  Vitals and nursing note reviewed.   Constitutional:       General: She is not in acute distress.     Appearance: Normal appearance. She is obese. She is not ill-appearing.   Musculoskeletal:      Right lower leg: No edema.      Left lower leg: No edema.   Skin:     General: Skin is warm and dry.      Findings: No rash.   Neurological:      Mental Status: She is alert and oriented to person, place, and time.   Psychiatric:         Mood and Affect: Mood normal.         Behavior: Behavior normal.         Thought Content: Thought content normal.         Judgment: Judgment normal.           ASSESSMENT AND PLAN:  Diagnoses and all orders for this visit:    1. Abnormal uterine bleeding (AUB) (Primary)  Assessment & Plan:  We have discussed options for therapy including continued medical therapy, changing to a different type of medical therapy, IUD use, and surgical therapy including dilation and curettage, endometrial ablation and hysterectomy.  After reviewing all these options the patient is strongly considering endometrial ablation.  We will switch the patient to Aygestin 10 mg oral daily to see if we can completely suppress her menstrual cycle or at least improve the number of days of spotting.  I have provided handouts on endometrial ablation.  Given the patient's continued " abnormal bleeding I recommended endometrial biopsy regardless of proceeding with ablation or not.  We will plan to follow-up for the endometrial biopsy and further discuss surgery from there.    Orders:  -     norethindrone (Aygestin) 5 MG tablet; Take 2 tablets by mouth Daily.  Dispense: 30 tablet; Refill: 3        Follow Up:  Return in about 3 weeks (around 12/12/2023) for embx.      Reginaldo Carrillo MD  11/21/2023

## 2023-12-13 ENCOUNTER — OFFICE VISIT (OUTPATIENT)
Dept: OBSTETRICS AND GYNECOLOGY | Facility: CLINIC | Age: 33
End: 2023-12-13
Payer: OTHER GOVERNMENT

## 2023-12-13 VITALS
HEIGHT: 62 IN | HEART RATE: 82 BPM | WEIGHT: 240 LBS | SYSTOLIC BLOOD PRESSURE: 138 MMHG | DIASTOLIC BLOOD PRESSURE: 83 MMHG | BODY MASS INDEX: 44.16 KG/M2

## 2023-12-13 DIAGNOSIS — N93.9 ABNORMAL UTERINE BLEEDING (AUB): Primary | ICD-10-CM

## 2023-12-13 LAB
B-HCG UR QL: NEGATIVE
EXPIRATION DATE: NORMAL
INTERNAL NEGATIVE CONTROL: NORMAL
INTERNAL POSITIVE CONTROL: NORMAL
Lab: NORMAL

## 2023-12-13 PROCEDURE — 88305 TISSUE EXAM BY PATHOLOGIST: CPT | Performed by: OBSTETRICS & GYNECOLOGY

## 2023-12-13 NOTE — PROGRESS NOTES
Endometrial Biopsy    Renate Corona is a 33 y.o. female,   , whose last menstrual period was Patient's last menstrual period was 2023..  The patient has a history of abnormal uterine bleeding and presents for an endometrial biopsy.  Patient denies vaginal bleeding. .  After the indications, risks, benefits, and alternatives to performing and endometrial biopsy were explained to the patient, and she desires to proceed.  A urine pregnancy test was negative.     PROCEDURE:  The patient was placed on the table in the supine lithotomy position.  She was draped in the appropriate manner.  A speculum was placed in the vagina.  The cervix was visualized and prepped with Betadine. A tenaculum was placed. A small plastic 5 mm Pipelle syringe curette was inserted into the cervical canal.  The uterus was sounded to 9 cms.  A vigorous four quadrant biopsy was performed, removing a moderate amount of tissue.  This tissue was placed in Formalin and sent to pathology.  The patient tolerated the procedure well and reported Mild cramping.  She had Mild cramping at the time of discharge.  Physical Exam    Procedures    Review of Systems      Plan:  Orders Placed This Encounter   Procedures    POC Pregnancy, Urine     Order Specific Question:   Release to patient     Answer:   Routine Release [[id number]]       Problem List Items Addressed This Visit       Abnormal uterine bleeding (AUB) - Primary    Relevant Orders    POC Pregnancy, Urine (Completed)    Tissue Pathology Exam           Instructions  Call the office in 5 business days for biopsy results.  Patient instructed to call the office if develops a fever of 100.4 or greater, vaginal bleeding heavier than a period, foul vaginal discharge or pain.      Reginaldo Carrillo MD

## 2023-12-15 LAB
CYTO UR: NORMAL
LAB AP CASE REPORT: NORMAL
LAB AP CLINICAL INFORMATION: NORMAL
PATH REPORT.FINAL DX SPEC: NORMAL
PATH REPORT.GROSS SPEC: NORMAL

## 2023-12-18 ENCOUNTER — TELEPHONE (OUTPATIENT)
Dept: OBSTETRICS AND GYNECOLOGY | Facility: CLINIC | Age: 33
End: 2023-12-18
Payer: OTHER GOVERNMENT

## 2023-12-18 NOTE — TELEPHONE ENCOUNTER
----- Message from Reginaldo Carrillo MD sent at 12/17/2023  7:29 AM EST -----  Please notify the patient that her endometrial biopsy was normal

## 2023-12-19 ENCOUNTER — OFFICE VISIT (OUTPATIENT)
Dept: OBSTETRICS AND GYNECOLOGY | Facility: CLINIC | Age: 33
End: 2023-12-19
Payer: OTHER GOVERNMENT

## 2023-12-19 VITALS
BODY MASS INDEX: 44.9 KG/M2 | WEIGHT: 244 LBS | DIASTOLIC BLOOD PRESSURE: 73 MMHG | HEIGHT: 62 IN | SYSTOLIC BLOOD PRESSURE: 118 MMHG | HEART RATE: 90 BPM

## 2023-12-19 DIAGNOSIS — N93.9 ABNORMAL UTERINE BLEEDING (AUB): Primary | ICD-10-CM

## 2023-12-19 PROCEDURE — 99213 OFFICE O/P EST LOW 20 MIN: CPT | Performed by: OBSTETRICS & GYNECOLOGY

## 2023-12-19 RX ORDER — SODIUM CHLORIDE, SODIUM LACTATE, POTASSIUM CHLORIDE, CALCIUM CHLORIDE 600; 310; 30; 20 MG/100ML; MG/100ML; MG/100ML; MG/100ML
125 INJECTION, SOLUTION INTRAVENOUS CONTINUOUS
OUTPATIENT
Start: 2023-12-19

## 2023-12-19 RX ORDER — SODIUM CHLORIDE 0.9 % (FLUSH) 0.9 %
3 SYRINGE (ML) INJECTION EVERY 12 HOURS SCHEDULED
OUTPATIENT
Start: 2023-12-19

## 2023-12-19 RX ORDER — SODIUM CHLORIDE 0.9 % (FLUSH) 0.9 %
10 SYRINGE (ML) INJECTION AS NEEDED
OUTPATIENT
Start: 2023-12-19

## 2023-12-19 RX ORDER — ONDANSETRON 2 MG/ML
4 INJECTION INTRAMUSCULAR; INTRAVENOUS EVERY 6 HOURS PRN
OUTPATIENT
Start: 2023-12-19

## 2023-12-19 RX ORDER — SODIUM CHLORIDE 9 MG/ML
40 INJECTION, SOLUTION INTRAVENOUS AS NEEDED
OUTPATIENT
Start: 2023-12-19

## 2023-12-19 NOTE — ASSESSMENT & PLAN NOTE
Reviewed the patient's biopsy results with her.  Patient does wish to proceed with surgery.  Discussed other options including continued medical therapy, IUD placement, D&C alone, endometrial ablation, and hysterectomy.  Patient does wish to proceed with endometrial ablation.  She is already had a permanent sterilization and has no desire for any future childbearing.  The risks, benefits, and alternatives to the procedure have been reviewed the patient.  We have discussed that the goal of endometrial ablation is to resume a normal menses and the amenorrhea is not guaranteed.  We discussed that amenorrhea only occurs in 40 to 50% of all patients who undergo endometrial ablation.  The risks included, but not limited to, infection, bleeding, hemorrhage, blood transfusion. Injury to nearby structures including: Bowel, bladder, pelvic blood vessels and nerves, ureters, and other nearby structures.  We have discussed the risk of delayed thermal injury to these nearby structures.  We have discussed the risk of continued abnormal bleeding and the possible need for further surgery, including hysterectomy.  We have discussed that pregnancy is contraindicated once an ablation has occurred and that the patient should never seek out pregnancy after endometrial ablation.  We discussed the risks of anesthesia.  The risks of postoperative complications, such as: Venous thromboembolism, myocardial infarction, stroke, and death.  The patient expressed her extending of the risks on the fracture, and alternatives to the procedure, and wishes to proceed.    Continue Aygestin 10 mg oral daily to help temporize bleeding until surgery.

## 2023-12-19 NOTE — PROGRESS NOTES
"GYN Visit    CC: Follow-up biopsy    HPI:   33 y.o. who presents in follow-up of an endometrial biopsy.  She has had no problems since her biopsy.  No change in symptoms.  She is interested in proceeding with endometrial ablation.    History: PMHx, Meds, Allergies, PSHx, Social Hx, and POBHx all reviewed and updated.    /73   Pulse 90   Ht 157.5 cm (62\")   Wt 111 kg (244 lb)   LMP 2023   Breastfeeding No   BMI 44.63 kg/m²     Physical Exam  Vitals and nursing note reviewed.   Constitutional:       General: She is not in acute distress.     Appearance: Normal appearance. She is obese. She is not ill-appearing.   Abdominal:      General: Abdomen is flat. There is no distension.      Palpations: Abdomen is soft. There is no mass.      Tenderness: There is no abdominal tenderness. There is no guarding or rebound.      Hernia: No hernia is present.   Neurological:      Mental Status: She is alert and oriented to person, place, and time.   Psychiatric:         Mood and Affect: Mood normal.         Behavior: Behavior normal.         Thought Content: Thought content normal.         Judgment: Judgment normal.           ASSESSMENT AND PLAN:  Diagnoses and all orders for this visit:    1. Abnormal uterine bleeding (AUB) (Primary)  Assessment & Plan:  Reviewed the patient's biopsy results with her.  Patient does wish to proceed with surgery.  Discussed other options including continued medical therapy, IUD placement, D&C alone, endometrial ablation, and hysterectomy.  Patient does wish to proceed with endometrial ablation.  She is already had a permanent sterilization and has no desire for any future childbearing.  The risks, benefits, and alternatives to the procedure have been reviewed the patient.  We have discussed that the goal of endometrial ablation is to resume a normal menses and the amenorrhea is not guaranteed.  We discussed that amenorrhea only occurs in 40 to 50% of all patients who undergo " endometrial ablation.  The risks included, but not limited to, infection, bleeding, hemorrhage, blood transfusion. Injury to nearby structures including: Bowel, bladder, pelvic blood vessels and nerves, ureters, and other nearby structures.  We have discussed the risk of delayed thermal injury to these nearby structures.  We have discussed the risk of continued abnormal bleeding and the possible need for further surgery, including hysterectomy.  We have discussed that pregnancy is contraindicated once an ablation has occurred and that the patient should never seek out pregnancy after endometrial ablation.  We discussed the risks of anesthesia.  The risks of postoperative complications, such as: Venous thromboembolism, myocardial infarction, stroke, and death.  The patient expressed her extending of the risks on the fracture, and alternatives to the procedure, and wishes to proceed.    Continue Aygestin 10 mg oral daily to help temporize bleeding until surgery.    Orders:  -     Case Request; Standing  -     sodium chloride 0.9 % flush 3 mL  -     sodium chloride 0.9 % flush 10 mL  -     sodium chloride 0.9 % infusion 40 mL  -     lactated ringers infusion  -     ondansetron (ZOFRAN) injection 4 mg  -     ceFAZolin (ANCEF) 2,000 mg in sodium chloride 0.9 % 100 mL IVPB  -     Case Request    Other orders  -     Follow Anesthesia Guidelines / Protocol; Future  -     Follow Anesthesia Guidelines / Protocol; Standing  -     Verify / Perform Chlorhexidine Skin Prep; Standing  -     Verify / Perform Chlorhexidine Skin Prep if Indicated (If Not Already Completed); Standing  -     Obtain Informed Consent; Future  -     Provide NPO Instructions to Patient; Future  -     Chlorhexidine Skin Prep; Future  -     Notify Physician - Standard; Standing  -     Type & Screen; Standing  -     Insert Peripheral IV; Standing  -     Saline Lock & Maintain IV Access; Standing  -     CBC & Differential; Standing  -     hCG, Quantitative,  Pregnancy; Standing        Counseling: TRACK MENSES, RTO if <q21 days (frequent) or >q3mo (infrequent IF not on hormonal BC), >7d long, heavy, or painful.      Follow Up:  Return for After surgery.    Reginaldo Carrillo MD  12/19/2023

## 2024-01-20 DIAGNOSIS — N93.9 ABNORMAL UTERINE BLEEDING (AUB): ICD-10-CM

## 2024-01-22 NOTE — TELEPHONE ENCOUNTER
Pharmacy requesting refill on Aygestin .  Last seen 12/19/23.  Next appointem nt 3/27/24.  Last filled 11/21/23 Aygestin # 30 with 3 refills

## 2024-02-29 ENCOUNTER — HOSPITAL ENCOUNTER (OUTPATIENT)
Dept: GENERAL RADIOLOGY | Facility: HOSPITAL | Age: 34
Discharge: HOME | End: 2024-02-29
Payer: OTHER GOVERNMENT

## 2024-02-29 ENCOUNTER — OFFICE VISIT (OUTPATIENT)
Dept: FAMILY MEDICINE CLINIC | Facility: CLINIC | Age: 34
End: 2024-02-29
Payer: OTHER GOVERNMENT

## 2024-02-29 VITALS
WEIGHT: 237.3 LBS | HEIGHT: 62 IN | OXYGEN SATURATION: 98 % | BODY MASS INDEX: 43.67 KG/M2 | SYSTOLIC BLOOD PRESSURE: 120 MMHG | HEART RATE: 87 BPM | DIASTOLIC BLOOD PRESSURE: 72 MMHG | TEMPERATURE: 98.3 F

## 2024-02-29 DIAGNOSIS — E55.9 VITAMIN D DEFICIENCY: ICD-10-CM

## 2024-02-29 DIAGNOSIS — M25.50 POLYARTHRALGIA: ICD-10-CM

## 2024-02-29 DIAGNOSIS — G89.29 CHRONIC BILATERAL LOW BACK PAIN WITHOUT SCIATICA: ICD-10-CM

## 2024-02-29 DIAGNOSIS — M54.50 CHRONIC BILATERAL LOW BACK PAIN WITHOUT SCIATICA: ICD-10-CM

## 2024-02-29 DIAGNOSIS — Z76.89 ENCOUNTER FOR SUPPORT AND COORDINATION OF TRANSITION OF CARE: Primary | ICD-10-CM

## 2024-02-29 PROCEDURE — 72050 X-RAY EXAM NECK SPINE 4/5VWS: CPT

## 2024-02-29 PROCEDURE — 72072 X-RAY EXAM THORAC SPINE 3VWS: CPT

## 2024-02-29 PROCEDURE — 72110 X-RAY EXAM L-2 SPINE 4/>VWS: CPT

## 2024-02-29 RX ORDER — MELOXICAM 7.5 MG/1
7.5 TABLET ORAL DAILY
Qty: 30 TABLET | Refills: 5 | Status: SHIPPED | OUTPATIENT
Start: 2024-02-29

## 2024-02-29 RX ORDER — ERGOCALCIFEROL 1.25 MG/1
50000 CAPSULE ORAL WEEKLY
Qty: 13 CAPSULE | Refills: 3 | Status: SHIPPED | OUTPATIENT
Start: 2024-02-29
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
## 2024-03-13 PROCEDURE — S0260 H&P FOR SURGERY: HCPCS | Performed by: OBSTETRICS & GYNECOLOGY

## 2024-03-13 RX ORDER — METHOCARBAMOL 500 MG/1
500 TABLET, FILM COATED ORAL 4 TIMES DAILY PRN
COMMUNITY
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
## 2024-03-13 NOTE — PRE-PROCEDURE INSTRUCTIONS
PATIENT INSTRUCTED TO BE:    - NOTHING TO EAT AFTER MIDNIGHT OR CHEW, EXCEPT CAN HAVE CLEAR LIQUIDS 2 HOURS PRIOR TO SURGERY ARRIVAL TIME     - TO HOLD ALL VITAMINS, SUPPLEMENTS, NSAIDS FOR ONE WEEK PRIOR TO THEIR SURGICAL PROCEDURE    - DO NOT TAKE -------------7 DAYS PRIOR TO PROCEDURE PER ANESTHESIA RECOMMENDATIONS/INSTRUCTIONS     - INSTRUCTED PT TO USE SURGICAL SOAP 1 TIME THE NIGHT PRIOR TO SURGERY OR THE AM OF SURGERY.   USE SOAP FROM NECK TO TOES AVOID THEIR FACE, HAIR, AND PRIVATE PARTS. INSTRUCTED NO LOTIONS, JEWELRY, PIERCINGS, OR DEODORANT DAY OF SURGERY    - IF DIABETIC, CHECK BLOOD GLUCOSE IF LESS THAN 70 OR HAVING SYMPTOMS CALL THE PREOP AREA FOR INSTRUCTIONS ON AM OF SURGERY ([phone] )    -INSTRUCTED TO TAKE THE FOLLOWING MEDICATIONS THE DAY OF SURGERY WITH SIPS OF WATER:                    ZYRTEC, ROBAXIN PRN, AYGESTIN       - DO NOT BRING ANY MEDICATIONS WITH YOU TO THE HOSPITAL THE DAY OF SURGERY, EXCEPT IF USE INHALERS. BRING INHALERS DAY OF SURGERY       - BRING CPAP OR BIPAP TO THE HOSPITAL ONLY IF ARE SPENDING THE NIGHT    - DO NOT SMOKE OR VAPE 24 HOURS PRIOR TO PROCEDURE PER ANESTHESIA REQUEST     -MAKE SURE YOU HAVE A RIDE HOME OR SOMEONE TO STAY WITH YOU THE DAY OF THE PROCEDURE AFTER YOU GO HOME    - FOLLOW ANY OTHER INSTRUCTIONS GIVEN TO YOU BY YOUR SURGEON'S OFFICE.     - PREADMISSION TESTING NURSE CELESTE MATAMOROS RN AT  [phone] IF HAVE ANY QUESTIONS     PATIENT PROVIDED THE NUMBER FOR PREOP SURGICAL DEPT IF HAD QUESTIONS AFTER HOURS PRIOR TO SURGERY ([phone] )  INFORMED PT IF NO ANSWER, LEAVE A MESSAGE AND SOMEONE WILL RETURN THEIR CALL       PATIENT VERBALIZED UNDERSTANDING

## 2024-03-14 ENCOUNTER — ANESTHESIA EVENT (OUTPATIENT)
Dept: PERIOP | Facility: HOSPITAL | Age: 34
End: 2024-03-14
Payer: OTHER GOVERNMENT

## 2024-03-14 ENCOUNTER — HOSPITAL ENCOUNTER (OUTPATIENT)
Facility: HOSPITAL | Age: 34
Setting detail: HOSPITAL OUTPATIENT SURGERY
Discharge: HOME | End: 2024-03-14
Attending: OBSTETRICS & GYNECOLOGY | Admitting: OBSTETRICS & GYNECOLOGY
Payer: OTHER GOVERNMENT

## 2024-03-14 ENCOUNTER — ANESTHESIA (OUTPATIENT)
Dept: PERIOP | Facility: HOSPITAL | Age: 34
End: 2024-03-14
Payer: OTHER GOVERNMENT

## 2024-03-14 VITALS
HEIGHT: 62 IN | HEART RATE: 83 BPM | BODY MASS INDEX: 43.29 KG/M2 | WEIGHT: 235.23 LBS | SYSTOLIC BLOOD PRESSURE: 129 MMHG | RESPIRATION RATE: 16 BRPM | TEMPERATURE: 97.3 F | DIASTOLIC BLOOD PRESSURE: 92 MMHG | OXYGEN SATURATION: 98 %

## 2024-03-14 DIAGNOSIS — N93.9 ABNORMAL UTERINE BLEEDING (AUB): ICD-10-CM

## 2024-03-14 DIAGNOSIS — Z98.890 S/P ENDOMETRIAL ABLATION: Primary | ICD-10-CM

## 2024-03-14 LAB
ABO GROUP BLD: NORMAL
ABO GROUP BLD: NORMAL
BASOPHILS # BLD AUTO: 0.11 10*3/MM3 (ref 0–0.2)
BASOPHILS NFR BLD AUTO: 0.9 % (ref 0–1.5)
BLD GP AB SCN SERPL QL: NEGATIVE
DEPRECATED RDW RBC AUTO: 42.1 FL (ref 37–54)
EOSINOPHIL # BLD AUTO: 0.28 10*3/MM3 (ref 0–0.4)
EOSINOPHIL NFR BLD AUTO: 2.2 % (ref 0.3–6.2)
ERYTHROCYTE [DISTWIDTH] IN BLOOD BY AUTOMATED COUNT: 13 % (ref 12.3–15.4)
HCG INTACT+B SERPL-ACNC: <0.5 MIU/ML
HCT VFR BLD AUTO: 47.1 % (ref 34–46.6)
HGB BLD-MCNC: 15.4 G/DL (ref 12–15.9)
IMM GRANULOCYTES # BLD AUTO: 0.04 10*3/MM3 (ref 0–0.05)
IMM GRANULOCYTES NFR BLD AUTO: 0.3 % (ref 0–0.5)
LYMPHOCYTES # BLD AUTO: 4.08 10*3/MM3 (ref 0.7–3.1)
LYMPHOCYTES NFR BLD AUTO: 32.4 % (ref 19.6–45.3)
MCH RBC QN AUTO: 28.9 PG (ref 26.6–33)
MCHC RBC AUTO-ENTMCNC: 32.7 G/DL (ref 31.5–35.7)
MCV RBC AUTO: 88.5 FL (ref 79–97)
MONOCYTES # BLD AUTO: 0.94 10*3/MM3 (ref 0.1–0.9)
MONOCYTES NFR BLD AUTO: 7.5 % (ref 5–12)
NEUTROPHILS NFR BLD AUTO: 56.7 % (ref 42.7–76)
NEUTROPHILS NFR BLD AUTO: 7.16 10*3/MM3 (ref 1.7–7)
NRBC BLD AUTO-RTO: 0 /100 WBC (ref 0–0.2)
PLATELET # BLD AUTO: 403 10*3/MM3 (ref 140–450)
PMV BLD AUTO: 11.3 FL (ref 6–12)
RBC # BLD AUTO: 5.32 10*6/MM3 (ref 3.77–5.28)
RH BLD: POSITIVE
RH BLD: POSITIVE
T&S EXPIRATION DATE: NORMAL
WBC NRBC COR # BLD AUTO: 12.61 10*3/MM3 (ref 3.4–10.8)

## 2024-03-14 PROCEDURE — 25010000002 ONDANSETRON PER 1 MG: Performed by: NURSE ANESTHETIST, CERTIFIED REGISTERED

## 2024-03-14 PROCEDURE — 58563 HYSTEROSCOPY ABLATION: CPT | Performed by: OBSTETRICS & GYNECOLOGY

## 2024-03-14 PROCEDURE — 25010000002 FENTANYL CITRATE (PF) 50 MCG/ML SOLUTION: Performed by: NURSE ANESTHETIST, CERTIFIED REGISTERED

## 2024-03-14 PROCEDURE — 25010000002 MEPERIDINE PER 100 MG: Performed by: NURSE ANESTHETIST, CERTIFIED REGISTERED

## 2024-03-14 PROCEDURE — 86900 BLOOD TYPING SEROLOGIC ABO: CPT | Performed by: OBSTETRICS & GYNECOLOGY

## 2024-03-14 PROCEDURE — 25010000002 PROPOFOL 10 MG/ML EMULSION: Performed by: NURSE ANESTHETIST, CERTIFIED REGISTERED

## 2024-03-14 PROCEDURE — 88305 TISSUE EXAM BY PATHOLOGIST: CPT | Performed by: OBSTETRICS & GYNECOLOGY

## 2024-03-14 PROCEDURE — 86850 RBC ANTIBODY SCREEN: CPT | Performed by: OBSTETRICS & GYNECOLOGY

## 2024-03-14 PROCEDURE — 86900 BLOOD TYPING SEROLOGIC ABO: CPT

## 2024-03-14 PROCEDURE — 25810000003 LACTATED RINGERS PER 1000 ML: Performed by: ANESTHESIOLOGY

## 2024-03-14 PROCEDURE — 86901 BLOOD TYPING SEROLOGIC RH(D): CPT | Performed by: OBSTETRICS & GYNECOLOGY

## 2024-03-14 PROCEDURE — 25010000002 BUPIVACAINE (PF) 0.5 % SOLUTION: Performed by: OBSTETRICS & GYNECOLOGY

## 2024-03-14 PROCEDURE — 0 CEFAZOLIN SODIUM 2 G RECONSTITUTED SOLUTION: Performed by: OBSTETRICS & GYNECOLOGY

## 2024-03-14 PROCEDURE — 25010000002 DEXAMETHASONE PER 1 MG: Performed by: NURSE ANESTHETIST, CERTIFIED REGISTERED

## 2024-03-14 PROCEDURE — 86901 BLOOD TYPING SEROLOGIC RH(D): CPT

## 2024-03-14 PROCEDURE — 25010000002 MIDAZOLAM PER 1MG: Performed by: ANESTHESIOLOGY

## 2024-03-14 PROCEDURE — 84702 CHORIONIC GONADOTROPIN TEST: CPT | Performed by: OBSTETRICS & GYNECOLOGY

## 2024-03-14 PROCEDURE — 85025 COMPLETE CBC W/AUTO DIFF WBC: CPT | Performed by: OBSTETRICS & GYNECOLOGY

## 2024-03-14 RX ORDER — ONDANSETRON 2 MG/ML
INJECTION INTRAMUSCULAR; INTRAVENOUS AS NEEDED
Status: DISCONTINUED | OUTPATIENT
Start: 2024-03-14 | End: 2024-03-14 | Stop reason: SURG

## 2024-03-14 RX ORDER — PROMETHAZINE HYDROCHLORIDE 25 MG/1
25 SUPPOSITORY RECTAL ONCE AS NEEDED
Status: DISCONTINUED | OUTPATIENT
Start: 2024-03-14 | End: 2024-03-14 | Stop reason: HOSPADM

## 2024-03-14 RX ORDER — PHENYLEPHRINE HCL IN 0.9% NACL 1 MG/10 ML
SYRINGE (ML) INTRAVENOUS AS NEEDED
Status: DISCONTINUED | OUTPATIENT
Start: 2024-03-14 | End: 2024-03-14 | Stop reason: SURG

## 2024-03-14 RX ORDER — MEPERIDINE HYDROCHLORIDE 25 MG/ML
12.5 INJECTION INTRAMUSCULAR; INTRAVENOUS; SUBCUTANEOUS
Status: DISCONTINUED | OUTPATIENT
Start: 2024-03-14 | End: 2024-03-14 | Stop reason: HOSPADM

## 2024-03-14 RX ORDER — DEXAMETHASONE SODIUM PHOSPHATE 4 MG/ML
INJECTION, SOLUTION INTRA-ARTICULAR; INTRALESIONAL; INTRAMUSCULAR; INTRAVENOUS; SOFT TISSUE AS NEEDED
Status: DISCONTINUED | OUTPATIENT
Start: 2024-03-14 | End: 2024-03-14 | Stop reason: SURG

## 2024-03-14 RX ORDER — SODIUM CHLORIDE, SODIUM LACTATE, POTASSIUM CHLORIDE, CALCIUM CHLORIDE 600; 310; 30; 20 MG/100ML; MG/100ML; MG/100ML; MG/100ML
125 INJECTION, SOLUTION INTRAVENOUS CONTINUOUS
Status: DISCONTINUED | OUTPATIENT
Start: 2024-03-14 | End: 2024-03-14 | Stop reason: HOSPADM

## 2024-03-14 RX ORDER — TRAMADOL HYDROCHLORIDE 50 MG/1
50 TABLET ORAL EVERY 6 HOURS PRN
Qty: 12 TABLET | Refills: 0 | Status: SHIPPED | OUTPATIENT
Start: 2024-03-14 | End: 2024-03-24

## 2024-03-14 RX ORDER — IBUPROFEN 600 MG/1
600 TABLET ORAL EVERY 6 HOURS PRN
Status: DISCONTINUED | OUTPATIENT
Start: 2024-03-14 | End: 2024-03-14 | Stop reason: HOSPADM

## 2024-03-14 RX ORDER — FENTANYL CITRATE 50 UG/ML
INJECTION, SOLUTION INTRAMUSCULAR; INTRAVENOUS AS NEEDED
Status: DISCONTINUED | OUTPATIENT
Start: 2024-03-14 | End: 2024-03-14 | Stop reason: SURG

## 2024-03-14 RX ORDER — PROPOFOL 10 MG/ML
VIAL (ML) INTRAVENOUS AS NEEDED
Status: DISCONTINUED | OUTPATIENT
Start: 2024-03-14 | End: 2024-03-14 | Stop reason: SURG

## 2024-03-14 RX ORDER — ACETAMINOPHEN 500 MG
1000 TABLET ORAL ONCE
Status: COMPLETED | OUTPATIENT
Start: 2024-03-14 | End: 2024-03-14

## 2024-03-14 RX ORDER — BUPIVACAINE HYDROCHLORIDE 5 MG/ML
INJECTION, SOLUTION EPIDURAL; INTRACAUDAL AS NEEDED
Status: DISCONTINUED | OUTPATIENT
Start: 2024-03-14 | End: 2024-03-14 | Stop reason: HOSPADM

## 2024-03-14 RX ORDER — SODIUM CHLORIDE 9 MG/ML
40 INJECTION, SOLUTION INTRAVENOUS AS NEEDED
Status: DISCONTINUED | OUTPATIENT
Start: 2024-03-14 | End: 2024-03-14 | Stop reason: HOSPADM

## 2024-03-14 RX ORDER — OXYCODONE HYDROCHLORIDE 5 MG/1
5 TABLET ORAL
Status: DISCONTINUED | OUTPATIENT
Start: 2024-03-14 | End: 2024-03-14 | Stop reason: HOSPADM

## 2024-03-14 RX ORDER — SODIUM CHLORIDE, SODIUM LACTATE, POTASSIUM CHLORIDE, CALCIUM CHLORIDE 600; 310; 30; 20 MG/100ML; MG/100ML; MG/100ML; MG/100ML
9 INJECTION, SOLUTION INTRAVENOUS CONTINUOUS PRN
Status: DISCONTINUED | OUTPATIENT
Start: 2024-03-14 | End: 2024-03-14 | Stop reason: HOSPADM

## 2024-03-14 RX ORDER — ONDANSETRON 2 MG/ML
4 INJECTION INTRAMUSCULAR; INTRAVENOUS EVERY 6 HOURS PRN
Status: DISCONTINUED | OUTPATIENT
Start: 2024-03-14 | End: 2024-03-14 | Stop reason: HOSPADM

## 2024-03-14 RX ORDER — BUPIVACAINE HCL/0.9 % NACL/PF 0.1 %
2000 PLASTIC BAG, INJECTION (ML) EPIDURAL ONCE
Status: COMPLETED | OUTPATIENT
Start: 2024-03-14 | End: 2024-03-14

## 2024-03-14 RX ORDER — SODIUM CHLORIDE 0.9 % (FLUSH) 0.9 %
3 SYRINGE (ML) INJECTION EVERY 12 HOURS SCHEDULED
Status: DISCONTINUED | OUTPATIENT
Start: 2024-03-14 | End: 2024-03-14 | Stop reason: HOSPADM

## 2024-03-14 RX ORDER — SCOLOPAMINE TRANSDERMAL SYSTEM 1 MG/1
1 PATCH, EXTENDED RELEASE TRANSDERMAL ONCE
Status: DISCONTINUED | OUTPATIENT
Start: 2024-03-14 | End: 2024-03-14 | Stop reason: HOSPADM

## 2024-03-14 RX ORDER — PROMETHAZINE HYDROCHLORIDE 12.5 MG/1
25 TABLET ORAL ONCE AS NEEDED
Status: DISCONTINUED | OUTPATIENT
Start: 2024-03-14 | End: 2024-03-14 | Stop reason: HOSPADM

## 2024-03-14 RX ORDER — DEXMEDETOMIDINE HYDROCHLORIDE 100 UG/ML
INJECTION, SOLUTION INTRAVENOUS AS NEEDED
Status: DISCONTINUED | OUTPATIENT
Start: 2024-03-14 | End: 2024-03-14 | Stop reason: SURG

## 2024-03-14 RX ORDER — ONDANSETRON 2 MG/ML
4 INJECTION INTRAMUSCULAR; INTRAVENOUS ONCE AS NEEDED
Status: DISCONTINUED | OUTPATIENT
Start: 2024-03-14 | End: 2024-03-14 | Stop reason: HOSPADM

## 2024-03-14 RX ORDER — MIDAZOLAM HYDROCHLORIDE 2 MG/2ML
2 INJECTION, SOLUTION INTRAMUSCULAR; INTRAVENOUS ONCE
Status: COMPLETED | OUTPATIENT
Start: 2024-03-14 | End: 2024-03-14

## 2024-03-14 RX ORDER — SODIUM CHLORIDE 0.9 % (FLUSH) 0.9 %
10 SYRINGE (ML) INJECTION AS NEEDED
Status: DISCONTINUED | OUTPATIENT
Start: 2024-03-14 | End: 2024-03-14 | Stop reason: HOSPADM

## 2024-03-14 RX ORDER — LIDOCAINE HYDROCHLORIDE 20 MG/ML
INJECTION, SOLUTION EPIDURAL; INFILTRATION; INTRACAUDAL; PERINEURAL AS NEEDED
Status: DISCONTINUED | OUTPATIENT
Start: 2024-03-14 | End: 2024-03-14 | Stop reason: SURG

## 2024-03-14 RX ORDER — MAGNESIUM HYDROXIDE 1200 MG/15ML
LIQUID ORAL AS NEEDED
Status: DISCONTINUED | OUTPATIENT
Start: 2024-03-14 | End: 2024-03-14 | Stop reason: HOSPADM

## 2024-03-14 RX ADMIN — DEXMEDETOMIDINE 10 MCG: 100 INJECTION, SOLUTION INTRAVENOUS at 07:26

## 2024-03-14 RX ADMIN — FENTANYL CITRATE 25 MCG: 50 INJECTION, SOLUTION INTRAMUSCULAR; INTRAVENOUS at 08:23

## 2024-03-14 RX ADMIN — DEXAMETHASONE SODIUM PHOSPHATE 4 MG: 4 INJECTION, SOLUTION INTRAMUSCULAR; INTRAVENOUS at 07:29

## 2024-03-14 RX ADMIN — DEXMEDETOMIDINE 10 MCG: 100 INJECTION, SOLUTION INTRAVENOUS at 07:31

## 2024-03-14 RX ADMIN — SODIUM CHLORIDE, POTASSIUM CHLORIDE, SODIUM LACTATE AND CALCIUM CHLORIDE 9 ML/HR: 600; 310; 30; 20 INJECTION, SOLUTION INTRAVENOUS at 07:06

## 2024-03-14 RX ADMIN — SODIUM CHLORIDE, POTASSIUM CHLORIDE, SODIUM LACTATE AND CALCIUM CHLORIDE: 600; 310; 30; 20 INJECTION, SOLUTION INTRAVENOUS at 08:33

## 2024-03-14 RX ADMIN — Medication 100 MCG: at 07:40

## 2024-03-14 RX ADMIN — MIDAZOLAM HYDROCHLORIDE 2 MG: 1 INJECTION, SOLUTION INTRAMUSCULAR; INTRAVENOUS at 07:07

## 2024-03-14 RX ADMIN — FENTANYL CITRATE 25 MCG: 50 INJECTION, SOLUTION INTRAMUSCULAR; INTRAVENOUS at 07:26

## 2024-03-14 RX ADMIN — SCOPALAMINE 1 PATCH: 1 PATCH, EXTENDED RELEASE TRANSDERMAL at 07:06

## 2024-03-14 RX ADMIN — DEXMEDETOMIDINE 10 MCG: 100 INJECTION, SOLUTION INTRAVENOUS at 07:22

## 2024-03-14 RX ADMIN — Medication 100 MCG: at 07:42

## 2024-03-14 RX ADMIN — ONDANSETRON HYDROCHLORIDE 4 MG: 2 SOLUTION INTRAMUSCULAR; INTRAVENOUS at 07:29

## 2024-03-14 RX ADMIN — LIDOCAINE HYDROCHLORIDE 100 MG: 20 INJECTION, SOLUTION INTRAVENOUS at 07:26

## 2024-03-14 RX ADMIN — PROPOFOL 200 MG: 10 INJECTION, EMULSION INTRAVENOUS at 07:26

## 2024-03-14 RX ADMIN — Medication 200 MCG: at 08:20

## 2024-03-14 RX ADMIN — DEXMEDETOMIDINE 10 MCG: 100 INJECTION, SOLUTION INTRAVENOUS at 08:28

## 2024-03-14 RX ADMIN — MEPERIDINE HYDROCHLORIDE 12.5 MG: 25 INJECTION INTRAMUSCULAR; INTRAVENOUS; SUBCUTANEOUS at 09:24

## 2024-03-14 RX ADMIN — DEXMEDETOMIDINE 10 MCG: 100 INJECTION, SOLUTION INTRAVENOUS at 08:24

## 2024-03-14 RX ADMIN — Medication 2000 MG: at 07:21

## 2024-03-14 RX ADMIN — Medication 200 MCG: at 07:59

## 2024-03-14 RX ADMIN — DEXMEDETOMIDINE 10 MCG: 100 INJECTION, SOLUTION INTRAVENOUS at 07:44

## 2024-03-14 RX ADMIN — ACETAMINOPHEN 1000 MG: 500 TABLET ORAL at 07:06

## 2024-03-14 RX ADMIN — ONDANSETRON 4 MG: 2 INJECTION INTRAMUSCULAR; INTRAVENOUS at 10:31

## 2024-03-14 RX ADMIN — FENTANYL CITRATE 50 MCG: 50 INJECTION, SOLUTION INTRAMUSCULAR; INTRAVENOUS at 08:29

## 2024-03-14 RX ADMIN — Medication 200 MCG: at 08:02

## 2024-03-14 NOTE — DISCHARGE INSTRUCTIONS
DISCHARGE INSTRUCTIONS  GYNECOLOGICAL  PROCEDURES      For your surgery you had:  General anesthesia (you may have a sore throat for the first 24 hours)     You may experience dizziness, drowsiness, or lightheadedness for several hours following surgery.  Do not stay alone today or tonight.  Limit your activity for 24 hours.  Resume your diet slowly.  Follow any special dietary instructions you may have been given by your doctor.  You should not drive or operate machinery, drink alcohol, or sign legally binding documents for 24 hours or while you are taking pain medication.    NOTIFY YOUR DOCTOR IF YOU EXPERIENCE ANY OF THE FOLLOWING:  Temperature greater than 101 degrees Fahrenheit  Shaking Chills  Redness or excessive drainage from incision  Nausea, vomiting and/or pain that is not controlled by prescribed medications  Increase in bleeding or bleeding that is excessive  Unable to urinate in 6 hours after surgery  If unable to reach your doctor, please go to the closest Emergency Room [] Remove dressing:      [] Skin adhesive will flake off in 10-14 days.    [x] Nothing in the vagina until follow up, to include intercourse, douches, or tampons.  [x] You may resume intercourse and the use of tampons as your physician has instructed you.      Vaginal bleeding may be expected for several days with flow decreasing with time and never any heavier than a normal  period.  If you have an ablation, vaginal discharge is expected after bleeding stops.   If you have foul smelling discharge, notify your physician.  Medications per physician instructions as indicated on After Visit Summary.    Last dose of pain medication was given at:   TYLENOL 1000 MG AT 7:06 AM .    SPECIAL INSTRUCTIONS:    No driving while taking narcotic pain medications  No intercourse, tampons, douching and nothing in the vagina until follow-up  Okay to shower tomorrow  No tub baths until follow-up    Call for temperature greater than 100 °F, shortness  of breath or chest pain, heavy vaginal bleeding soaking a pad in less than 1 hour, redness swelling or drainage from the incisions, excessive nausea or vomiting, or pain that is worsening despite current pain medications

## 2024-03-14 NOTE — ANESTHESIA POSTPROCEDURE EVALUATION
Patient: Renate Corona    Procedure Summary       Date: 03/14/24 Room / Location: [facility] OR 05 / [facility] MAIN OR    Anesthesia Start: 0719 Anesthesia Stop: 0843    Procedure: DILATATION AND CURETTAGE HYSTEROSCOPY ENDOMETRIAL ABLATION (Vagina) Diagnosis:       Abnormal uterine bleeding (AUB)      (Abnormal uterine bleeding (AUB) [N93.9])    Surgeons: Reginaldo Carrillo MD Provider: Jose Pemberton MD    Anesthesia Type: general ASA Status: 3            Anesthesia Type: general    Vitals  Vitals Value Taken Time   BP 82/46 03/14/24 0937   Temp 36.1 °C (97 °F) 03/14/24 0836   Pulse 62 03/14/24 0940   Resp 18 03/14/24 0836   SpO2 89 % 03/14/24 0940   Vitals shown include unfiled device data.        Post Anesthesia Care and Evaluation    Patient location during evaluation: bedside  Patient participation: complete - patient participated  Level of consciousness: awake  Pain management: adequate    Airway patency: patent  PONV Status: none  Cardiovascular status: acceptable and stable  Respiratory status: acceptable  Hydration status: acceptable    Comments: An Anesthesiologist personally participated in the most demanding procedures (including induction and emergence if applicable) in the anesthesia plan, monitored the course of anesthesia administration at frequent intervals and remained physically present and available for immediate diagnosis and treatment of emergencies.

## 2024-03-14 NOTE — OP NOTE
DILATATION AND CURETTAGE HYSTEROSCOPY NOVASURE ENDOMETRIAL ABLATION  Procedure Report    Patient Name:  Renate Corona  YOB: 1990    Date of Surgery:  3/14/2024     Pre-op Diagnosis:   Abnormal uterine bleeding (AUB) [N93.9]       Post-Op Diagnosis Codes:     * Abnormal uterine bleeding (AUB) [N93.9]    Procedure(s):  DILATATION AND CURETTAGE HYSTEROSCOPY ENDOMETRIAL ABLATION    Staff:  Surgeon(s):  Reginaldo Carrillo MD     Anesthesia: General    Estimated Blood Loss:  10ML      Specimen:          Specimens       ID Source Type Tests Collected By Collected At Frozen?    A Endometrial Curettings Tissue TISSUE PATHOLOGY EXAM   Reginaldo Carrillo MD 3/14/24 0806                 Findings: Normal external genitalia, vagina, normal-appearing cervix.  The uterine cavity was normal.  There is no evidence of submucous myoma, congenital anomaly, or endometrial polyp.    Complications: None    Description of Procedure: After reviewing the informed consent, including the risks, benefits and alternatives to the procedure, the patient expressed her understanding and wished to proceed.  She was taken to the operating room with an I.V. in place and running.  She was placed on the operating table in the dorsal supine position. She was given a general anesthetic.  She was re-positioned with her arms out to the side, and her legs in Yellofin stirrups.  We took care in positioning both the arms and the legs, padding any potential pressure points.  The patient was prepped and draped in the usual sterile fashion.  An in and out catheterization was performed to drain the patient's bladder.  A surgical time-out was performed.  I inserted a Babin retractor into the posterior vagina, and a Hitchcock retractor into the anterior vagina. I grasped the cervix with the single tooth tenaculum.  I carried out my paracervical block with 10 ml of 0.5% Marcaine with Epinephrine. I introduced a 5 mm 30 degree diagnostic hysteroscope,  with normal saline as my distension media, through the external cervical os and into the uterine cavity without difficulty.      I surveyed the uterine cavity.  The uterine cavity appeared normal, without evidence of endometrial polyp, submucous myoma, or congenital anomaly.  The hysteroscope was removed.  The uterus was sounded to 7.5 cm.  Given the small size of the uterus, I did not feel the NovaSure ablation device would be likely to deploy completely so opted for the HTA ablation device.  The cervix was dilated to 8 mm.  I performed a systematic sharp curettage of the entire endometrium. This specimen was passed off as endometrial curettings.  I re-introduced the hysteroscope. There was a good curettage of the entire endometrium, and no evidence of any uterine injury.  The hydrothermal ablation system cavity assessment was initiated.  During the cavity assessment there was no loss of fluid.  The hydrothermal ablation system was activated.  Once the temperature maintained 90°C the ablation cycle countdown began.  The ablation carried out for 10 minutes.  There was no loss of any fluid during the entire ablation cycle.  There was good visualization due to throughout the ablation.  There was excellent blanching of the entire endometrium.  The hydrothermal ablation system went through a 2 minute cool down.  Representative photographs were taken after the ablation.  There is no unintended uterine injury, and the endocervix was spared.  The hysteroscope was removed. I removed the tenaculum.  The tenaculum sites were hemostatic. The other instruments were removed.  Normal saline was used as a uterine distention media, and there was no significant fluid deficit.    The patient was taken out of lithotomy position, awoken from her anesthesia and then taken to the recovery room in satisfactory condition.  All counts were correct x 2.  The patient received Kefzol as her preoperative antibiotics. The patient will be  discharged home when she meets discharge criteria. She will follow-up with me in 2-3 weeks.  She was instructed to call the office for temperature than greater than 100 degrees Fahrenheit, shortness of breath or chest pain, excessive nausea or vomiting with inability to tolerate oral intake, pain that is worsening despite current pain medications, heavy vaginal bleeding, or other concerns.          Reginaldo Carrillo MD     Date: 3/14/2024  Time: 08:31 EDT

## 2024-03-14 NOTE — ANESTHESIA PREPROCEDURE EVALUATION
Anesthesia Evaluation     Patient summary reviewed and Nursing notes reviewed   no history of anesthetic complications:   NPO Solid Status: > 8 hours  NPO Liquid Status: > 2 hours           Airway   Mallampati: II  TM distance: >3 FB  Neck ROM: full  No difficulty expected  Dental      Pulmonary - normal exam    breath sounds clear to auscultation  (+) asthma (stable, no inhalers),  Cardiovascular - negative cardio ROS and normal exam  Exercise tolerance: good (4-7 METS)    Rhythm: regular  Rate: normal        Neuro/Psych- negative ROS  GI/Hepatic/Renal/Endo    (+) morbid obesity    Musculoskeletal     Abdominal    Substance History      OB/GYN          Other        ROS/Med Hx Other: PAT Nursing Notes unavailable.               Anesthesia Plan    ASA 3     general     (Patient understands anesthesia not responsible for dental damage.)  intravenous induction     Anesthetic plan, risks, benefits, and alternatives have been provided, discussed and informed consent has been obtained with: patient.    Plan discussed with CRNA.    CODE STATUS:

## 2024-03-24 PROBLEM — N93.9 ABNORMAL UTERINE BLEEDING (AUB): Status: RESOLVED | Noted: 2023-08-21 | Resolved: 2024-03-24

## 2024-03-24 NOTE — PROGRESS NOTES
"Post Operative Visit      CC: Post operative follow up    HPI:   Pain:  No  Vaginal bleeding:  No  Vaginal discharge:  No  Fever/chills:  No  Good appetite:  Yes  Normal bladder function:  Yes  Normal bowel function:  Yes  Hot flashes: N/A  No problems or concerns since surgery    Operative report, surgical findings and any pathology reviewed.    /84   Pulse 106   Ht 157.5 cm (62\")   Wt 106 kg (234 lb)   Breastfeeding No   BMI 42.80 kg/m²     Physical Exam  Vitals and nursing note reviewed.   Constitutional:       General: She is not in acute distress.     Appearance: Normal appearance. She is not ill-appearing.   Neurological:      Mental Status: She is alert and oriented to person, place, and time.   Psychiatric:         Mood and Affect: Mood normal.         Behavior: Behavior normal.         Thought Content: Thought content normal.         Judgment: Judgment normal.         Assessment and Plan:  Diagnoses and all orders for this visit:    1. Postoperative visit (Primary)  Assessment & Plan:  Stable postoperative course  May resume normal activities  Recommend OTC NSAIDs for any further postoperative pain      2. S/P endometrial ablation         Any available photos and/or pathology were reviewed.  All questions answered.   Ok to resume normal activities  Ok to resume intercourse  Return to school/work without limitations    Follow Up:  Return for Annual physical.    Reginaldo Carrillo MD  03/27/2024    "

## 2024-03-27 ENCOUNTER — OFFICE VISIT (OUTPATIENT)
Dept: OBSTETRICS AND GYNECOLOGY | Facility: CLINIC | Age: 34
End: 2024-03-27
Payer: OTHER GOVERNMENT

## 2024-03-27 VITALS
BODY MASS INDEX: 43.06 KG/M2 | HEIGHT: 62 IN | DIASTOLIC BLOOD PRESSURE: 84 MMHG | SYSTOLIC BLOOD PRESSURE: 132 MMHG | HEART RATE: 106 BPM | WEIGHT: 234 LBS

## 2024-03-27 DIAGNOSIS — Z98.890 S/P ENDOMETRIAL ABLATION: ICD-10-CM

## 2024-03-27 DIAGNOSIS — Z48.89 POSTOPERATIVE VISIT: Primary | ICD-10-CM

## 2024-03-27 PROCEDURE — 99024 POSTOP FOLLOW-UP VISIT: CPT | Performed by: OBSTETRICS & GYNECOLOGY

## 2024-03-27 NOTE — ASSESSMENT & PLAN NOTE
Stable postoperative course  May resume normal activities  Recommend OTC NSAIDs for any further postoperative pain

## 2024-04-01 DIAGNOSIS — M25.50 POLYARTHRALGIA: ICD-10-CM

## 2024-04-01 RX ORDER — MELOXICAM 7.5 MG/1
7.5 TABLET ORAL DAILY
Qty: 30 TABLET | Refills: 5 | Status: SHIPPED | OUTPATIENT
Start: 2024-04-01

## 2024-05-05 DIAGNOSIS — M25.50 POLYARTHRALGIA: ICD-10-CM

## 2024-05-06 RX ORDER — MELOXICAM 7.5 MG/1
7.5 TABLET ORAL DAILY
Qty: 30 TABLET | Refills: 5 | Status: SHIPPED | OUTPATIENT
Start: 2024-05-06

## 2025-02-06 DIAGNOSIS — M25.50 POLYARTHRALGIA: ICD-10-CM

## 2025-02-06 RX ORDER — MELOXICAM 7.5 MG/1
7.5 TABLET ORAL DAILY
Qty: 30 TABLET | Refills: 5 | OUTPATIENT
Start: 2025-02-06

## (undated) DEVICE — PROB ABL ENDOMTRL NOVASURE/G4 W/SURESND

## (undated) DEVICE — SHEET,DRAPE,70X85,STERILE: Brand: MEDLINE

## (undated) DEVICE — LITHOTOMY-YELLOW FINS: Brand: MEDLINE INDUSTRIES, INC.

## (undated) DEVICE — ST THRM ABLATOR HTA PROCERVA GENESYS

## (undated) DEVICE — CATH URETH INTRMIT ALLPURP LTX 16F RED

## (undated) DEVICE — SOL NACL 0.9PCT 1000ML

## (undated) DEVICE — 1000ML,PRESSURE INFUSER W/STOPCOCK: Brand: MEDLINE

## (undated) DEVICE — SLV SCD KN/LEN ADJ EXPRSS BLENDED MD 1P/U

## (undated) DEVICE — GOWN,NON-REINFORCED,SIRUS,SET IN SLV,XXL: Brand: MEDLINE

## (undated) DEVICE — GLV SURG BIOGEL LTX PF 7

## (undated) DEVICE — VAGINAL PREP TRAY: Brand: MEDLINE INDUSTRIES, INC.